# Patient Record
(demographics unavailable — no encounter records)

---

## 2025-03-25 NOTE — HISTORY OF PRESENT ILLNESS
[Mother] : mother [Fat free ___ oz/d] : consumes [unfilled] oz of fat free cow's milk per day [Fruit] : fruit [Meat] : meat [Grains] : grains [Eggs] : eggs [Dairy] : dairy [Normal] : Normal [In bed] : In bed [Brushing teeth] : Brushing teeth [Yes] : Patient goes to dentist yearly [None] : Primary Fluoride Source: None [No] : Not at  exposure [Smoke Detectors] : Smoke detectors [Carbon Monoxide Detectors] : Carbon monoxide detectors [Up to date] : Up to date [YES] : Yes [Are there any unlocked firearms stored in your household?] : There are unlocked firearms in the household. [Are there any children in your household?] : There are children in the household. [Have you attended a firearm safety workshop or class?] : A firearm safety workshop or class has been attended. [de-identified] : soups. very picky [FreeTextEntry9] : [facility] [Are there any firearms stored in your household that are loaded?] : No firearms are stored in the household loaded. [Has anyone in the household been feeling low/depressed/been struggling?] : No one in the household has been feeling low/depressed/been struggling. [FreeTextEntry1] : [facility] thru august. 3k in fall, will get all services and special class

## 2025-03-25 NOTE — DISCUSSION/SUMMARY
[Normal Growth] : growth [Normal Development] : development [None] : No known medical problems [No Elimination Concerns] : elimination [No Feeding Concerns] : feeding [No Skin Concerns] : skin [Normal Sleep Pattern] : sleep [No Medications] : ~He/She~ is not on any medications [Parent/Guardian] : parent/guardian [FreeTextEntry1] :  Patient to return for a well  appointment at 3 yo autism

## 2025-03-25 NOTE — DEVELOPMENTAL MILESTONES
[Normal Development] : Normal Development [Yes: _______] : yes, [unfilled] [Put on coat, jacket, or shirt by self] : puts on coat, jacket, or shirt by self [Eats independently] : eats independently [Climbs on and off couch] : climbs on and off couch or chair [Jumps forward] : jumps forward [Draws a single [place]] : draws a single [place] [Goes to the bathroom and urinates] : does not go to bathroom and urinates by self [Plays and shares with others] : does not play and share with others [Begins to play make-believe] : does not begin to play make-believe [Uses 3-word sentences] : does not use 3-word sentences [Understands simple prepositions] : does not understand simple prepositions [Tells a story from a book or TV] : does not tell a story from a book or TV [Compares things using words such] : does not compare things using words such as bigger or shorter [Pedals tricycle] : does not pedal tricycle [Draws a person with head] : does not draw a person with head and one other body part [Cuts with child scissor] : does not cut with child scissor [FreeTextEntry1] : gets speech- ot -pt- [facility]  [Yes] : Completed.

## 2025-03-25 NOTE — PHYSICAL EXAM
